# Patient Record
Sex: FEMALE | Race: BLACK OR AFRICAN AMERICAN | NOT HISPANIC OR LATINO | URBAN - METROPOLITAN AREA
[De-identification: names, ages, dates, MRNs, and addresses within clinical notes are randomized per-mention and may not be internally consistent; named-entity substitution may affect disease eponyms.]

---

## 2017-01-06 ENCOUNTER — OFFICE VISIT (OUTPATIENT)
Dept: FAMILY MEDICINE | Facility: CLINIC | Age: 67
End: 2017-01-06

## 2017-01-06 VITALS
DIASTOLIC BLOOD PRESSURE: 72 MMHG | SYSTOLIC BLOOD PRESSURE: 103 MMHG | BODY MASS INDEX: 37.19 KG/M2 | TEMPERATURE: 98 F | WEIGHT: 220 LBS | HEART RATE: 82 BPM

## 2017-01-06 DIAGNOSIS — M72.2 PLANTAR FASCIITIS: ICD-10-CM

## 2017-01-06 DIAGNOSIS — Z00.00 PREVENTATIVE HEALTH CARE: Primary | ICD-10-CM

## 2017-01-06 ASSESSMENT — ANXIETY QUESTIONNAIRES

## 2017-01-06 ASSESSMENT — PATIENT HEALTH QUESTIONNAIRE - PHQ9: 5. POOR APPETITE OR OVEREATING: NOT AT ALL

## 2017-01-06 NOTE — PATIENT INSTRUCTIONS
Do the exercises with stretching like we talked about. Do them for 30 seconds at a time for three times each leg. The do this three to four times per day.     Please return in 2-3 months for recheck of your feet pain.            Plantar Fasciitis  Plantar fasciitis is a painful swelling of the plantar fascia. The plantar fascia is a thick, fibrous layer of tissue. It covers the bones on the bottom of your foot. And it supports the foot bones in an arched position.  This can happen gradually or suddenly. It usually affects one foot at a time. Heel pain can be sharp, like a knife sticking into the bottom of your foot. You may feel pain after exercising, long-distance jogging, stair climbing, long periods of standing, or after standing up.  Risk factors include: non-active lifestyle, arthritis, diabetes, obesity or recent weight gain, flat foot, high arch. Wearing high heels, loose shoes, or shoes with poor arch support for long periods of time adds to the risk. This problem is commonly found in runners and dancers. It also found in people who stand on hard surfaces for long periods of time.    Foot pain from this condition is usually worse in the morning. But it improves with walking. By the end of the day there may be a dull aching. Treatment requires short-term rest and controlling swelling. It may take up to 9 months before all symptoms go away. Rarely, a steroid injection into the foot, or surgery, may be needed.  Home care    If you are overweight, lose weight to help healing.    Choose supportive shoes with good arch support and shock absorbency. Replace athletic shoes when they become worn out. Don t walk or run barefoot.    Premade or custom-fitted shoe inserts may be helpful. Inserts made of silicone seem to be the most effective. Custom-made inserts can be provided by a podiatrist or foot specialist, physical therapist, or orthopedist.    Premade or custom-made night splints keep the heel stretched out while  you sleep. They may prevent morning pain.    Avoid activities that stress the feet: jogging, prolonged standing or walking, contact sports, etc.    First thing in the morning and before sports, stretch the bottom of your feet. Gently flex your ankle so the toes move toward your knee.    Icing may help control heel pain. Apply an ice pack to the heel for 10-20 minutes as a preventive. Or ice your heel after a severe flare-up of symptoms. You may repeat this every 1-2 hours as needed.    You may use over-the-counter pain medicine to control pain, unless another medicine was prescribed. Anti-inflammatory pain medicines, such as ibuprofen or naproxen, may work better than acetaminophen. If you have chronic liver or kidney disease or ever had a stomach ulcer or GI bleeding, talk with your healthcare provider before using these medicines.  Follow-up care   Follow up with your healthcare provider, physical therapist, or podiatrist or foot specialist as advised.  Call for an appointment if pain worsens or there is no relief after a few weeks of home treatment. Shoe inserts, a night splint, or a special boot may be required.  If X-rays were taken, you will be told of any new findings that may affect your care.  When to seek medical advice  Call your healthcare provider right away if any of these occur:     Foot swelling    Redness with increasing pain    8198-4792 [employer]. [address] [place] [address], [place] [address]. All rights reserved. This information is not intended as a substitute for professional medical care. Always follow your healthcare professional's instructions.

## 2017-01-06 NOTE — MR AVS SNAPSHOT
After Visit Summary   1/6/2017    Rahel Schofield    MRN: [id number]           Patient Information     Date Of Birth          1950        Visit Information        Provider Department      1/6/2017 1:50 PM Volodymyr Adams MD [facility]        Today's Diagnoses     Preventative health care    -  1       Care Instructions    Do the exercises with stretching like we talked about. Do them for 30 seconds at a time for three times each leg. The do this three to four times per day.     Please return in 2-3 months for recheck of your feet pain.            Plantar Fasciitis  Plantar fasciitis is a painful swelling of the plantar fascia. The plantar fascia is a thick, fibrous layer of tissue. It covers the bones on the bottom of your foot. And it supports the foot bones in an arched position.  This can happen gradually or suddenly. It usually affects one foot at a time. Heel pain can be sharp, like a knife sticking into the bottom of your foot. You may feel pain after exercising, long-distance jogging, stair climbing, long periods of standing, or after standing up.  Risk factors include: non-active lifestyle, arthritis, diabetes, obesity or recent weight gain, flat foot, high arch. Wearing high heels, loose shoes, or shoes with poor arch support for long periods of time adds to the risk. This problem is commonly found in runners and dancers. It also found in people who stand on hard surfaces for long periods of time.    Foot pain from this condition is usually worse in the morning. But it improves with walking. By the end of the day there may be a dull aching. Treatment requires short-term rest and controlling swelling. It may take up to 9 months before all symptoms go away. Rarely, a steroid injection into the foot, or surgery, may be needed.  Home care    If you are overweight, lose weight to help healing.    Choose supportive shoes with good arch support and shock absorbency. Replace athletic shoes when  they become worn out. Don t walk or run barefoot.    Premade or custom-fitted shoe inserts may be helpful. Inserts made of silicone seem to be the most effective. Custom-made inserts can be provided by a podiatrist or foot specialist, physical therapist, or orthopedist.    Premade or custom-made night splints keep the heel stretched out while you sleep. They may prevent morning pain.    Avoid activities that stress the feet: jogging, prolonged standing or walking, contact sports, etc.    First thing in the morning and before sports, stretch the bottom of your feet. Gently flex your ankle so the toes move toward your knee.    Icing may help control heel pain. Apply an ice pack to the heel for 10-20 minutes as a preventive. Or ice your heel after a severe flare-up of symptoms. You may repeat this every 1-2 hours as needed.    You may use over-the-counter pain medicine to control pain, unless another medicine was prescribed. Anti-inflammatory pain medicines, such as ibuprofen or naproxen, may work better than acetaminophen. If you have chronic liver or kidney disease or ever had a stomach ulcer or GI bleeding, talk with your healthcare provider before using these medicines.  Follow-up care   Follow up with your healthcare provider, physical therapist, or podiatrist or foot specialist as advised.  Call for an appointment if pain worsens or there is no relief after a few weeks of home treatment. Shoe inserts, a night splint, or a special boot may be required.  If X-rays were taken, you will be told of any new findings that may affect your care.  When to seek medical advice  Call your healthcare provider right away if any of these occur:     Foot swelling    Redness with increasing pain    6056-8232 [employer]. [address] [place] [address]. All rights reserved. This information is not intended as a substitute for professional medical care. Always follow your healthcare professional's  instructions.              Follow-ups after your visit        Future tests that were ordered for you today     Open Future Orders        Priority Expected Expires Ordered    Fecal Occult Blood - FIT, iFOB ([facility]) Routine  2017            Who to contact     Please call your clinic at [phone] to:    Ask questions about your health    Make or cancel appointments    Discuss your medicines    Learn about your test results    Speak to your doctor   If you have compliments or concerns about an experience at your clinic, or if you wish to file a complaint, please contact [facility] Patient Relations at [phone] or email us at [email or web address]         Additional Information About Your Visit        MyChart Information     Nostot is an electronic gateway that provides easy, online access to your medical records. With [employer], you can request a clinic appointment, read your test results, renew a prescription or communicate with your care team.     To sign up for Nostot visit the website at [email or web address]   You will be asked to enter the access code listed below, as well as some personal information. Please follow the directions to create your username and password.     Your access code is: [email or web address]  Expires: 2017  2:15 PM     Your access code will  in 90 days. If you need help or a new code, please contact your [facility] or call [phone] for assistance.        Care EveryWhere ID     This is your Care EveryWhere ID. This could be used by other organizations to access your [facility] medical records  [id number]        Your Vitals Were     Pulse Temperature                82 98  F (36.7  C) (Oral)           Blood Pressure from Last 3 Encounters:   17 103/72   16 113/76   16 113/76    Weight from Last 3 Encounters:   17 220 lb (99.791 kg)   16 219 lb (99.338 kg)   16  225 lb 12.8 oz (102.422 kg)               Primary Care Provider Office Phone # Fax #    Volodymyr Adams [phone]       [address] [address]        Thank you!     Thank you for choosing [facility]  for your care. Our goal is always to provide you with excellent care. Hearing back from our patients is one way we can continue to improve our services. Please take a few minutes to complete the written survey that you may receive in the mail after your visit with us. Thank you!             Your Updated Medication List - Protect others around you: Learn how to safely use, store and throw away your medicines at www.disposemymeds.org.          This list is accurate as of: 1/6/17  2:37 PM.  Always use your most recent med list.                   Brand Name Dispense Instructions for use    * ACE KNEE BRACE LARGE/X-LARGE Misc     1 each    1 Units daily       * [employer] LUMBAR/BACK SUPPORT BRACE Misc     1 each    1 Units daily       * acetaminophen 325 MG tablet    TYLENOL     Take 650 mg by mouth       * acetaminophen 325 MG tablet    TYLENOL    100 tablet    Take 2 tablets (650 mg) by mouth every 6 hours as needed for pain       aspirin 81 MG EC tablet     90 tablet    Take 1 tablet (81 mg) by mouth daily       capsaicin 0.075 % cream    ZOSTRIX    120 g    Apply topically 3 times daily       guaiFENesin-dextromethorphan 100-10 MG/5ML syrup    ROBITUSSIN DM     Take 5 mLs by mouth       magnesium hydroxide 400 MG/5ML suspension    MILK OF MAGNESIA     Take 30 mLs by mouth       naproxen 500 MG tablet    NAPROSYN    60 tablet    Take 1 tablet (500 mg) by mouth 2 times daily as needed for moderate pain       nitroglycerin 0.4 MG sublingual tablet    NITROSTAT    25 tablet    For chest pain place 1 tablet under the tongue every 5 minutes for 3 doses. If symptoms persist 5 minutes after 1st dose call 911.       order for DME     1 Units    Equipment being ordered: Heel cups        ranitidine 300 MG tablet    ZANTAC    60 tablet    Take 1 tablet (300 mg) by mouth daily (with breakfast)       TRAMADOL HCL PO      Take 50 mg by mouth Take one tablet by mouth four times daily as needed for pain       * VITAMIN D (CHOLECALCIFEROL) PO      Take by mouth daily       * vitamin D3 2000 UNITS Caps      Take 2,000 Units by mouth       * vitamin D 2000 UNITS tablet     100 tablet    Take 2,000 Units by mouth daily       * Notice:  This list has 7 medication(s) that are the same as other medications prescribed for you. Read the directions carefully, and ask your doctor or other care provider to review them with you.

## 2017-01-06 NOTE — NURSING NOTE
Patient is due for Tdap, but does not want it today.     name: Nitesh Ng  Language: [place]  Agency: [facility]  Phone number: [phone]

## 2017-01-06 NOTE — PROGRESS NOTES
Preceptor attestation:  Patient seen and discussed with the resident.  Assessment and plan reviewed with resident and agreed upon.  Supervising physician: Angie Hummel  [facility]

## 2017-01-06 NOTE — PROGRESS NOTES
OFFICE VISIT    ASSESSMENT/PLAN:   Rahel was seen today for headache, forms and other.  Diagnoses and all orders for this visit:    Plantar fasciitis: unchanged. Reviewed appropriate stretching and treatment options. Continue conservative management.   - Continue stretching  - Continue NSAIDs as needed.   - Continue PT.     Completed health care summary form and given to patient.     Preventative health care  -     Fecal Occult Blood - FIT, iFOB ([facility]); Future    Patient instructed to return in 2-3 months for recheck.    SUBJECTIVE: 67 year old female with history of plantar fasciitis, chronic low back pain presenting with heel pain and request for completion of Health Care Summary.     Heel pain: plantar fasciitis. Went to PT. Revisited pathophysiology of plantar fasciitis and anticipated course of treatment. Discussed treatment options. Reviewed stretches. Made appropriate adjustments in patient's stretching form.     The 10 point ROS is negative except as stated in the HPI.    A Syncbak  was used during the visit.     OBJECTIVE:   /72 mmHg  Pulse 82  Temp(Src) 98  F (36.7  C) (Oral)  Ht   Wt 220 lb (99.791 kg)  GENERAL: No acute distress. Cooperative. Well-appearing.  HEENT: Normocephalic atraumatic. No conjunctival injection or scleral icterus. Nares patent without rhinorrhea. Oral mucosa is moist  EXTR: Moves all limbs independently. Bilateral upper extremity strength 5/5 in adduction, abduction, flexion, and extension. Bilateral lower extremity strength 5/5 in adduction, abduction, flexion, and extension. No lower extremity edema x 2.    Discussed with Dr. Hummel who agrees with the above assessment and plan.    Volodymyr Adams MD

## 2017-01-07 ASSESSMENT — PATIENT HEALTH QUESTIONNAIRE - PHQ9: SUM OF ALL RESPONSES TO PHQ QUESTIONS 1-9: 0

## 2017-01-07 ASSESSMENT — ANXIETY QUESTIONNAIRES: GAD7 TOTAL SCORE: 0

## 2017-01-11 DIAGNOSIS — Z00.00 PREVENTATIVE HEALTH CARE: ICD-10-CM

## 2017-01-11 LAB — HEMOCCULT STL QL IA: NEGATIVE

## 2017-02-06 ENCOUNTER — OFFICE VISIT (OUTPATIENT)
Dept: FAMILY MEDICINE | Facility: CLINIC | Age: 67
End: 2017-02-06

## 2017-02-06 VITALS
WEIGHT: 219.4 LBS | DIASTOLIC BLOOD PRESSURE: 73 MMHG | BODY MASS INDEX: 35.26 KG/M2 | HEIGHT: 66 IN | SYSTOLIC BLOOD PRESSURE: 108 MMHG | HEART RATE: 92 BPM

## 2017-02-06 DIAGNOSIS — E55.9 VITAMIN D DEFICIENCY: Primary | ICD-10-CM

## 2017-02-06 DIAGNOSIS — M72.2 PLANTAR FASCIITIS OF RIGHT FOOT: ICD-10-CM

## 2017-02-06 DIAGNOSIS — K59.00 CONSTIPATION, UNSPECIFIED CONSTIPATION TYPE: ICD-10-CM

## 2017-02-06 DIAGNOSIS — Z00.00 HEALTHCARE MAINTENANCE: ICD-10-CM

## 2017-02-06 RX ORDER — NAPROXEN 500 MG/1
500 TABLET ORAL 2 TIMES DAILY PRN
Qty: 60 TABLET | Refills: 0 | Status: SHIPPED | OUTPATIENT
Start: 2017-02-06 | End: 2017-04-06

## 2017-02-06 RX ORDER — ACETAMINOPHEN 325 MG/1
650 TABLET ORAL EVERY 6 HOURS PRN
Qty: 100 TABLET | Refills: 0 | Status: SHIPPED | OUTPATIENT
Start: 2017-02-06

## 2017-02-06 RX ORDER — SENNOSIDES 8.6 MG
2 TABLET ORAL 2 TIMES DAILY
Qty: 120 TABLET | Refills: 1 | Status: SHIPPED | OUTPATIENT
Start: 2017-02-06

## 2017-02-06 RX ORDER — CHOLECALCIFEROL (VITAMIN D3) 50 MCG
2000 TABLET ORAL DAILY
Qty: 100 TABLET | Refills: 3 | Status: SHIPPED | OUTPATIENT
Start: 2017-02-06 | End: 2017-05-11
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
## 2017-02-06 NOTE — PATIENT INSTRUCTIONS
Rahel was seen today for back pain and musculoskeletal problem.    Diagnoses and all orders for this visit:    Vitamin D deficiency  -     Cholecalciferol (VITAMIN D) 2000 UNITS tablet; Take 2,000 Units by mouth daily    Midline low back pain without sciatica, unspecified chronicity  -     acetaminophen (TYLENOL) 325 MG tablet; Take 2 tablets (650 mg) by mouth every 6 hours as needed for pain  -     naproxen (NAPROSYN) 500 MG tablet; Take 1 tablet (500 mg) by mouth 2 times daily as needed for moderate pain    Plantar fasciitis of right foot  -     acetaminophen (TYLENOL) 325 MG tablet; Take 2 tablets (650 mg) by mouth every 6 hours as needed for pain  -     naproxen (NAPROSYN) 500 MG tablet; Take 1 tablet (500 mg) by mouth 2 times daily as needed for moderate pain    Constipation, unspecified constipation type  -     sennosides (SENOKOT) 8.6 MG tablet; Take 2 tablets by mouth 2 times daily      Please return to clinic in 2-3 months to recheck, sooner as needed.     Thank you for coming to [facility].  **If you had lab testing today and your results are reassuring or normal they will be be mailed to you within 7 days.   **If the lab tests need quick action we will call you with the results.  The phone number we will call with results is # [phone] (home) . If this is not the best number please call our clinic and change the number.  If you need any refills please call your pharmacy and they will contact us.  If you have any concerns about today's visit or wish to schedule another appointment please call our office during normal business hours [phone] (8-5:00 M-F)  If you have urgent medical concerns please call [phone] at any time of the day.  If you a medical emergency please call [phone]  Again thank you for choosing [facility] and please let us know how we can best partner with you to improve you and your family's health.        Treating Plantar Fasciitis  First, your healthcare provider  tries to determine the cause of your problem in order to suggest ways to relieve pain. If your pain is due to poor foot mechanics, custom-made shoe inserts (orthoses) may help.    Reduce symptoms    To relieve mild symptoms, try aspirin, ibuprofen, or other medicines as directed. Rubbing ice on the affected area may also help.    To reduce severe pain and swelling, your healthcare provider may prescribe pills or injections or a walking cast in some instances. Physical therapy, such as ultrasound or a daily stretching program, may also be recommended. Surgery is rarely required.    To reduce symptoms caused by poor foot mechanics, your foot may be taped. This supports the arch and temporarily controls movement. Night splints may also help by stretching the fascia.  Control movement  If taping helps, your healthcare provider may prescribe orthoses. Built from plaster casts of your feet, these inserts control the way your foot moves. As a result, your symptoms should go away.  Reduce overuse  Every time your foot strikes the ground, the plantar fascia is stretched. You can reduce the strain on the plantar fascia and the possibility of overuse by following these suggestions:    Lose any excess weight.    Avoid running on hard or uneven ground.    Use orthoses at all times in your shoes and house slippers.  If surgery is needed  Your healthcare provider may consider surgery if other types of treatment don't control your pain. During surgery, the plantar fascia is partially cut to release tension. As you heal, fibrous tissue fills the space between the heel bone and the plantar fascia.     2036-5085 [employer]. [address] [place] [address], [place] [address]. All rights reserved. This information is not intended as a substitute for professional medical care. Always follow your healthcare professional's instructions.    You are scheduled for MAMMOGRAM at:  [address].  [place]  [place] [address]  [phone]  Date:  Wednesday February 15, 2017  Time:  10:45 AM  No lotion, powder, deodorant or perfume under the arms and around the breast area.   has been requested.  Given to Gwyn Solis ().  Dinah MAYBERRY Pack  2/9/17

## 2017-02-06 NOTE — MR AVS SNAPSHOT
After Visit Summary   2/6/2017    Rahel Schofield    MRN: [id number]           Patient Information     Date Of Birth          1950        Visit Information        Provider Department      2/6/2017 4:30 PM Volodymyr Adams MD [facility]        Today's Diagnoses     Vitamin D deficiency    -  1     Left knee pain, unspecified chronicity         Left-sided low back pain with left-sided sciatica, unspecified chronicity         Midline low back pain without sciatica, unspecified chronicity         Plantar fasciitis of right foot         Constipation, unspecified constipation type           Care Instructions    Rahel was seen today for back pain and musculoskeletal problem.    Diagnoses and all orders for this visit:    Vitamin D deficiency  -     Cholecalciferol (VITAMIN D) 2000 UNITS tablet; Take 2,000 Units by mouth daily    Midline low back pain without sciatica, unspecified chronicity  -     acetaminophen (TYLENOL) 325 MG tablet; Take 2 tablets (650 mg) by mouth every 6 hours as needed for pain  -     naproxen (NAPROSYN) 500 MG tablet; Take 1 tablet (500 mg) by mouth 2 times daily as needed for moderate pain    Plantar fasciitis of right foot  -     acetaminophen (TYLENOL) 325 MG tablet; Take 2 tablets (650 mg) by mouth every 6 hours as needed for pain  -     naproxen (NAPROSYN) 500 MG tablet; Take 1 tablet (500 mg) by mouth 2 times daily as needed for moderate pain    Constipation, unspecified constipation type  -     sennosides (SENOKOT) 8.6 MG tablet; Take 2 tablets by mouth 2 times daily      Please return to clinic in 2-3 months to recheck, sooner as needed.     Thank you for coming to [facility].  **If you had lab testing today and your results are reassuring or normal they will be be mailed to you within 7 days.   **If the lab tests need quick action we will call you with the results.  The phone number we will call with results is # [phone] (home) . If this is not the best number  please call our clinic and change the number.  If you need any refills please call your pharmacy and they will contact us.  If you have any concerns about today's visit or wish to schedule another appointment please call our office during normal business hours [phone] (8-5:00 M-F)  If you have urgent medical concerns please call [phone] at any time of the day.  If you a medical emergency please call 911  Again thank you for choosing [facility] and please let us know how we can best partner with you to improve you and your family's health.        Treating Plantar Fasciitis  First, your healthcare provider tries to determine the cause of your problem in order to suggest ways to relieve pain. If your pain is due to poor foot mechanics, custom-made shoe inserts (orthoses) may help.    Reduce symptoms    To relieve mild symptoms, try aspirin, ibuprofen, or other medicines as directed. Rubbing ice on the affected area may also help.    To reduce severe pain and swelling, your healthcare provider may prescribe pills or injections or a walking cast in some instances. Physical therapy, such as ultrasound or a daily stretching program, may also be recommended. Surgery is rarely required.    To reduce symptoms caused by poor foot mechanics, your foot may be taped. This supports the arch and temporarily controls movement. Night splints may also help by stretching the fascia.  Control movement  If taping helps, your healthcare provider may prescribe orthoses. Built from plaster casts of your feet, these inserts control the way your foot moves. As a result, your symptoms should go away.  Reduce overuse  Every time your foot strikes the ground, the plantar fascia is stretched. You can reduce the strain on the plantar fascia and the possibility of overuse by following these suggestions:    Lose any excess weight.    Avoid running on hard or uneven ground.    Use orthoses at all times in your shoes and house  slippers.  If surgery is needed  Your healthcare provider may consider surgery if other types of treatment don't control your pain. During surgery, the plantar fascia is partially cut to release tension. As you heal, fibrous tissue fills the space between the heel bone and the plantar fascia.     6291-1071 [employer]. [address] [place] [address] 15196. All rights reserved. This information is not intended as a substitute for professional medical care. Always follow your healthcare professional's instructions.                Follow-ups after your visit        Who to contact     Please call your clinic at [phone] to:    Ask questions about your health    Make or cancel appointments    Discuss your medicines    Learn about your test results    Speak to your doctor   If you have compliments or concerns about an experience at your clinic, or if you wish to file a complaint, please contact [facility] Patient Relations at [phone] or email us at [email or web address]         Additional Information About Your Visit        [employer] Information     Automated Insights is an electronic gateway that provides easy, online access to your medical records. With Automated Insights, you can request a clinic appointment, read your test results, renew a prescription or communicate with your care team.     To sign up for [employer]ights visit the website at [email or web address]   You will be asked to enter the access code listed below, as well as some personal information. Please follow the directions to create your username and password.     Your access code is: [email or web address]  Expires: 2017  4:54 PM     Your access code will  in 90 days. If you need help or a new code, please contact your [facility] or call [phone] for assistance.        Care EveryWhere ID     This is your Care EveryWhere ID. This could be used by other organizations to access your  "[place] medical records  [id number]        Your Vitals Were     Pulse Height BMI (Body Mass Index)             92 5' 6\" (167.6 cm) 35.43 kg/m2          Blood Pressure from Last 3 Encounters:   02/06/17 108/73   01/06/17 103/72   12/29/16 113/76    Weight from Last 3 Encounters:   02/06/17 219 lb 6.4 oz (99.519 kg)   01/06/17 220 lb (99.791 kg)   12/29/16 219 lb (99.338 kg)              Today, you had the following     No orders found for display         Today's Medication Changes          These changes are accurate as of: 2/6/17  4:54 PM.  If you have any questions, ask your nurse or doctor.               Start taking these medicines.        Dose/Directions    sennosides 8.6 MG tablet   Commonly known as:  SENOKOT   Used for:  Constipation, unspecified constipation type   Started by:  Volodymyr Adams MD        Dose:  2 tablet   Take 2 tablets by mouth 2 times daily   Quantity:  120 tablet   Refills:  1         Stop taking these medicines if you haven't already. Please contact your care team if you have questions.     magnesium hydroxide 400 MG/5ML suspension   Commonly known as:  MILK OF MAGNESIA   Stopped by:  Volodymyr Adams MD                Where to get your medicines      These medications were sent to [facility] - [place], [place] - [address], [place] [address]     Phone:  [phone]    - acetaminophen 325 MG tablet  - naproxen 500 MG tablet  - sennosides 8.6 MG tablet  - vitamin D 2000 UNITS tablet             Primary Care Provider Office Phone # Fax #    Volodymyr Adams [phone]       [address] [address]        Thank you!     Thank you for choosing [facility]  for your care. Our goal is always to provide you with excellent care. Hearing back from our patients is one way we can continue to improve our services. Please take a few minutes to complete the written survey that you may receive in the mail after your " visit with us. Thank you!             Your Updated Medication List - Protect others around you: Learn how to safely use, store and throw away your medicines at www.disposemymeds.org.          This list is accurate as of: 2/6/17  4:54 PM.  Always use your most recent med list.                   Brand Name Dispense Instructions for use    * ACE KNEE BRACE LARGE/X-LARGE Misc     1 each    1 Units daily       * CVS LUMBAR/BACK SUPPORT BRACE Misc     1 each    1 Units daily       * acetaminophen 325 MG tablet    TYLENOL     Take 650 mg by mouth       * acetaminophen 325 MG tablet    TYLENOL    100 tablet    Take 2 tablets (650 mg) by mouth every 6 hours as needed for pain       aspirin 81 MG EC tablet     90 tablet    Take 1 tablet (81 mg) by mouth daily       capsaicin 0.075 % cream    ZOSTRIX    120 g    Apply topically 3 times daily       guaiFENesin-dextromethorphan 100-10 MG/5ML syrup    ROBITUSSIN DM     Take 5 mLs by mouth       naproxen 500 MG tablet    NAPROSYN    60 tablet    Take 1 tablet (500 mg) by mouth 2 times daily as needed for moderate pain       nitroglycerin 0.4 MG sublingual tablet    NITROSTAT    25 tablet    For chest pain place 1 tablet under the tongue every 5 minutes for 3 doses. If symptoms persist 5 minutes after 1st dose call 911.       order for DME     1 Units    Equipment being ordered: Heel cups       ranitidine 300 MG tablet    ZANTAC    60 tablet    Take 1 tablet (300 mg) by mouth daily (with breakfast)       sennosides 8.6 MG tablet    SENOKOT    120 tablet    Take 2 tablets by mouth 2 times daily       TRAMADOL HCL PO      Take 50 mg by mouth Take one tablet by mouth four times daily as needed for pain       * VITAMIN D (CHOLECALCIFEROL) PO      Take by mouth daily       * vitamin D3 2000 UNITS Caps      Take 2,000 Units by mouth       * vitamin D 2000 UNITS tablet     100 tablet    Take 2,000 Units by mouth daily       * Notice:  This list has 7 medication(s) that are the same as  other medications prescribed for you. Read the directions carefully, and ask your doctor or other care provider to review them with you.

## 2017-02-10 NOTE — PROGRESS NOTES
Preceptor attestation:  Patient seen and discussed with the resident. Assessment and plan reviewed with resident and agreed upon.  Supervising physician: Chris Wolff  [facility]

## 2017-02-16 ENCOUNTER — RECORDS - HEALTHEAST (OUTPATIENT)
Dept: MAMMOGRAPHY | Facility: CLINIC | Age: 67
End: 2017-02-16

## 2017-02-16 DIAGNOSIS — Z12.31 ENCOUNTER FOR SCREENING MAMMOGRAM FOR MALIGNANT NEOPLASM OF BREAST: ICD-10-CM

## 2017-04-06 ENCOUNTER — OFFICE VISIT (OUTPATIENT)
Dept: FAMILY MEDICINE | Facility: CLINIC | Age: 67
End: 2017-04-06

## 2017-04-06 VITALS
TEMPERATURE: 98.4 F | BODY MASS INDEX: 36.96 KG/M2 | HEART RATE: 78 BPM | OXYGEN SATURATION: 96 % | DIASTOLIC BLOOD PRESSURE: 67 MMHG | WEIGHT: 229 LBS | SYSTOLIC BLOOD PRESSURE: 95 MMHG

## 2017-04-06 DIAGNOSIS — G89.29 CHRONIC LEFT-SIDED LOW BACK PAIN WITH LEFT-SIDED SCIATICA: ICD-10-CM

## 2017-04-06 DIAGNOSIS — M54.42 CHRONIC LEFT-SIDED LOW BACK PAIN WITH LEFT-SIDED SCIATICA: ICD-10-CM

## 2017-04-06 DIAGNOSIS — M72.2 PLANTAR FASCIITIS OF RIGHT FOOT: ICD-10-CM

## 2017-04-06 DIAGNOSIS — M25.562 CHRONIC PAIN OF LEFT KNEE: ICD-10-CM

## 2017-04-06 DIAGNOSIS — G89.29 CHRONIC PAIN OF LEFT KNEE: ICD-10-CM

## 2017-04-06 RX ORDER — LEG BRACE
1 EACH MISCELLANEOUS DAILY
Qty: 1 EACH | Refills: 0 | Status: SHIPPED | OUTPATIENT
Start: 2017-04-06

## 2017-04-06 RX ORDER — LEG BRACE
1 EACH MISCELLANEOUS DAILY
Qty: 1 EACH | Refills: 0 | Status: SHIPPED | OUTPATIENT
Start: 2017-04-06 | End: 2017-04-06

## 2017-04-06 RX ORDER — NAPROXEN 500 MG/1
500 TABLET ORAL 2 TIMES DAILY PRN
Qty: 60 TABLET | Refills: 3 | Status: SHIPPED | OUTPATIENT
Start: 2017-04-06 | End: 2017-05-11

## 2017-04-06 NOTE — MR AVS SNAPSHOT
After Visit Summary   2017    Rahel Schofield    MRN: [id number]           Patient Information     Date Of Birth          1950        Visit Information        Provider Department      2017 2:30 PM Moises Narayanan MD [facility]        Today's Diagnoses     Chronic left-sided low back pain with left-sided sciatica        Chronic pain of left knee        Plantar fasciitis of right foot          Care Instructions    1) Take sheets to [facility] for Knee, Back braces.    2) Use Naproxen for moderate to severe pain        Follow-ups after your visit        Who to contact     Please call your clinic at [phone] to:    Ask questions about your health    Make or cancel appointments    Discuss your medicines    Learn about your test results    Speak to your doctor   If you have compliments or concerns about an experience at your clinic, or if you wish to file a complaint, please contact [facility] Patient Relations at [phone] or email us at [email or web address]         Additional Information About Your Visit        MyChart Information     E[employer]el Learn is an electronic gateway that provides easy, online access to your medical records. With E[employer] Learn, you can request a clinic appointment, read your test results, renew a prescription or communicate with your care team.     To sign up for [employer] visit the website at [email or web address]   You will be asked to enter the access code listed below, as well as some personal information. Please follow the directions to create your username and password.     Your access code is: [email or web address]  Expires: 2017  5:54 PM     Your access code will  in 90 days. If you need help or a new code, please contact your [facility] or call [phone] for assistance.        Care EveryWhere ID     This is your Care EveryWhere ID. This could be used by other organizations to access your  [place] medical records  [id number]        Your Vitals Were     Pulse Temperature Pulse Oximetry Breastfeeding? BMI (Body Mass Index)       78 98.4  F (36.9  C) 96% No 36.96 kg/m2        Blood Pressure from Last 3 Encounters:   04/06/17 95/67   02/06/17 108/73   01/06/17 103/72    Weight from Last 3 Encounters:   04/06/17 229 lb (103.9 kg)   02/06/17 219 lb 6.4 oz (99.5 kg)   01/06/17 220 lb (99.8 kg)              Today, you had the following     No orders found for display         Today's Medication Changes          These changes are accurate as of: 4/6/17  3:18 PM.  If you have any questions, ask your nurse or doctor.               Start taking these medicines.        Dose/Directions    * ACE KNEE BRACE LARGE/X-LARGE Misc   Used for:  Chronic pain of left knee   Started by:  Moises Narayanan MD        Dose:  1 Units   1 Units daily   Quantity:  1 each   Refills:  0       * CVS LUMBAR/BACK SUPPORT BRACE Misc   Used for:  Chronic left-sided low back pain with left-sided sciatica   Started by:  Moises Narayanan MD        Dose:  1 Units   1 Units daily   Quantity:  1 each   Refills:  0       * Notice:  This list has 2 medication(s) that are the same as other medications prescribed for you. Read the directions carefully, and ask your doctor or other care provider to review them with you.         Where to get your medicines      [facility] [place], [place] - [address], [place] [address]     Phone:  [phone]     naproxen 500 MG tablet         Some of these will need a paper prescription and others can be bought over the counter.  Ask your nurse if you have questions.     Bring a paper prescription for each of these medications     ACE KNEE BRACE LARGE/X-LARGE Misc    CVS LUMBAR/BACK SUPPORT BRACE Misc                Primary Care Provider Office Phone # Fax #    Nabil Mesa [phone]       [address]  [facility] [address]        Thank you!     Thank you for choosing [facility]  for your care. Our goal is always to provide you with excellent care. Hearing back from our patients is one way we can continue to improve our services. Please take a few minutes to complete the written survey that you may receive in the mail after your visit with us. Thank you!             Your Updated Medication List - Protect others around you: Learn how to safely use, store and throw away your medicines at www.disposemymeds.org.          This list is accurate as of: 4/6/17  3:18 PM.  Always use your most recent med list.                   Brand Name Dispense Instructions for use    * ACE KNEE BRACE LARGE/X-LARGE Misc     1 each    1 Units daily       * CVS LUMBAR/BACK SUPPORT BRACE Misc     1 each    1 Units daily       * acetaminophen 325 MG tablet    TYLENOL     Take 650 mg by mouth       * acetaminophen 325 MG tablet    TYLENOL    100 tablet    Take 2 tablets (650 mg) by mouth every 6 hours as needed for pain       aspirin 81 MG EC tablet     90 tablet    Take 1 tablet (81 mg) by mouth daily       capsaicin 0.075 % cream    ZOSTRIX    120 g    Apply topically 3 times daily       guaiFENesin-dextromethorphan 100-10 MG/5ML syrup    ROBITUSSIN DM     Take 5 mLs by mouth       naproxen 500 MG tablet    NAPROSYN    60 tablet    Take 1 tablet (500 mg) by mouth 2 times daily as needed for moderate pain       nitroglycerin 0.4 MG sublingual tablet    NITROSTAT    25 tablet    For chest pain place 1 tablet under the tongue every 5 minutes for 3 doses. If symptoms persist 5 minutes after 1st dose call 911.       order for DME     1 Units    Equipment being ordered: Heel cups       ranitidine 300 MG tablet    ZANTAC    60 tablet    Take 1 tablet (300 mg) by mouth daily (with breakfast)       sennosides 8.6 MG tablet    SENOKOT    120 tablet    Take 2 tablets by mouth 2 times daily       TRAMADOL HCL PO      Take 50 mg by mouth Take one  tablet by mouth four times daily as needed for pain       * VITAMIN D (CHOLECALCIFEROL) PO      Take by mouth daily       * vitamin D3 2000 UNITS Caps      Take 2,000 Units by mouth       * vitamin D 2000 UNITS tablet     100 tablet    Take 2,000 Units by mouth daily       * Notice:  This list has 7 medication(s) that are the same as other medications prescribed for you. Read the directions carefully, and ask your doctor or other care provider to review them with you.

## 2017-04-06 NOTE — PATIENT INSTRUCTIONS
1) Take sheets to [facility] for Knee, Back braces.    2) Use Naproxen for moderate to severe pain

## 2017-04-06 NOTE — NURSING NOTE
name: Sis  Language: Venezuelan  Agency: [facility]  Phone number: [phone]
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
## 2017-05-11 ENCOUNTER — OFFICE VISIT (OUTPATIENT)
Dept: FAMILY MEDICINE | Facility: CLINIC | Age: 67
End: 2017-05-11

## 2017-05-11 VITALS
WEIGHT: 227.8 LBS | DIASTOLIC BLOOD PRESSURE: 74 MMHG | HEART RATE: 90 BPM | TEMPERATURE: 97.8 F | BODY MASS INDEX: 37.95 KG/M2 | HEIGHT: 65 IN | SYSTOLIC BLOOD PRESSURE: 105 MMHG

## 2017-05-11 DIAGNOSIS — E55.9 VITAMIN D DEFICIENCY: ICD-10-CM

## 2017-05-11 DIAGNOSIS — M72.2 PLANTAR FASCIITIS OF RIGHT FOOT: ICD-10-CM

## 2017-05-11 DIAGNOSIS — M19.011 OSTEOARTHRITIS OF BOTH SHOULDERS, UNSPECIFIED OSTEOARTHRITIS TYPE: Primary | ICD-10-CM

## 2017-05-11 DIAGNOSIS — M19.012 OSTEOARTHRITIS OF BOTH SHOULDERS, UNSPECIFIED OSTEOARTHRITIS TYPE: Primary | ICD-10-CM

## 2017-05-11 DIAGNOSIS — M54.50 CHRONIC BILATERAL LOW BACK PAIN WITHOUT SCIATICA: ICD-10-CM

## 2017-05-11 DIAGNOSIS — G89.29 CHRONIC BILATERAL LOW BACK PAIN WITHOUT SCIATICA: ICD-10-CM

## 2017-05-11 RX ORDER — ASPIRIN 81 MG
TABLET,CHEWABLE ORAL 3 TIMES DAILY
Qty: 1 TUBE | Refills: 3 | Status: SHIPPED | OUTPATIENT
Start: 2017-05-11 | End: 2017-05-11

## 2017-05-11 RX ORDER — CAPSAICIN 0.025 %
CREAM (GRAM) TOPICAL
Qty: 1 TUBE | Refills: 3 | Status: SHIPPED | OUTPATIENT
Start: 2017-05-11

## 2017-05-11 RX ORDER — NAPROXEN 500 MG/1
500 TABLET ORAL 2 TIMES DAILY PRN
Qty: 60 TABLET | Refills: 3 | Status: SHIPPED | OUTPATIENT
Start: 2017-05-11

## 2017-05-11 RX ORDER — CHOLECALCIFEROL (VITAMIN D3) 50 MCG
2000 TABLET ORAL DAILY
Qty: 100 TABLET | Refills: 3 | Status: SHIPPED | OUTPATIENT
Start: 2017-05-11

## 2017-05-11 NOTE — PROGRESS NOTES
Preceptor attestation:  Patient seen and discussed with the resident. Assessment and plan reviewed with resident and agreed upon.  Supervising physician: Moises Narayanan  [facility]
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
## 2017-05-11 NOTE — PATIENT INSTRUCTIONS
Rahel,     Please  your prescription for a cream to help with your pain. Also I would like you to try physical therapy. You can stop at the desk upstairs to discuss this.     I would like you to come back and see me in 4-6 weeks after trying physical therapy.     Thank you for allowing me to be a part of your health care team!    Sincerely,   Dr. Betts    [facility]  Physical Therapy  [phone]  Referral has been faxed they will contact pt to schedule  Julia Rodríguez 9:24 AM 5/12/2017

## 2017-05-11 NOTE — MR AVS SNAPSHOT
After Visit Summary   5/11/2017    Rahel Schofield    MRN: [id number]           Patient Information     Date Of Birth          1950        Visit Information        Provider Department      5/11/2017 3:10 PM Yaa Betts MD [facility]        Today's Diagnoses     Osteoarthritis of both shoulders, unspecified osteoarthritis type    -  1    Chronic bilateral low back pain without sciatica          Care Instructions    Rahel,     Please  your prescription for a cream to help with your pain. Also I would like you to try physical therapy. You can stop at the desk upstairs to discuss this.     I would like you to come back and see me in 4-6 weeks after trying physical therapy.     Thank you for allowing me to be a part of your health care team!    Sincerely,   Dr. Betts          Follow-ups after your visit        Additional Services     PHYSICAL THERAPY REFERRAL       PT/OT REFERRAL  Rahel Schofield  1950  Phone #: [phone] (home)    needed: Yes  Language: Dutch    PT/OT  Facility:   Other: per patient request    History: back pain after accident in 2008    Precautions/Contraindications: None    Imaging Studies: None    Surgical Procedure/Test Results: None    Treatment Goals:   Increase: Flexibility, Strength and ROM  Decrease: Pain    Prognosis: good    Visits: Up to 12    Evaluate: Evaluate and treat    Plan: Manual Therapy, Flexibility Exercise and Strength Exercise                  Future tests that were ordered for you today     Open Future Orders        Priority Expected Expires Ordered    PHYSICAL THERAPY REFERRAL Routine  5/11/2018 5/11/2017            Who to contact     Please call your clinic at [phone] to:    Ask questions about your health    Make or cancel appointments    Discuss your medicines    Learn about your test results    Speak to your doctor   If you have compliments or concerns about an experience at your clinic, or if you wish to file a  "complaint, please contact [facility] Patient Relations at [phone] or email us at [email or web address]         Additional Information About Your Visit        [employer] Information     [employer] is an electronic gateway that provides easy, online access to your medical records. With [employer], you can request a clinic appointment, read your test results, renew a prescription or communicate with your care team.     To sign up for [employer] visit the website at [email or web address]   You will be asked to enter the access code listed below, as well as some personal information. Please follow the directions to create your username and password.     Your access code is: [email or web address]  Expires: 2017  4:03 PM     Your access code will  in 90 days. If you need help or a new code, please contact your [facility] or call [phone] for assistance.        Care EveryWhere ID     This is your Care EveryWhere ID. This could be used by other organizations to access your [facility] medical records  [id number]        Your Vitals Were     Pulse Temperature Height BMI (Body Mass Index)          90 97.8  F (36.6  C) (Oral) 5' 4.5\" (163.8 cm) 38.5 kg/m2         Blood Pressure from Last 3 Encounters:   17 105/74   17 95/67   17 108/73    Weight from Last 3 Encounters:   17 227 lb 12.8 oz (103.3 kg)   17 229 lb (103.9 kg)   17 219 lb 6.4 oz (99.5 kg)                 Today's Medication Changes          These changes are accurate as of: 17  4:03 PM.  If you have any questions, ask your nurse or doctor.               These medicines have changed or have updated prescriptions.        Dose/Directions    * capsaicin 0.075 % cream   Commonly known as:  ZOSTRIX   This may have changed:  Another medication with the same name was added. Make sure you understand how and when to take each.   Used for:  Left knee pain, Left-sided " low back pain with left-sided sciatica   Changed by:  Rahul Cody,         Apply topically 3 times daily   Quantity:  120 g   Refills:  2       * Capsaicin 0.1 % cream   This may have changed:  You were already taking a medication with the same name, and this prescription was added. Make sure you understand how and when to take each.   Used for:  Osteoarthritis of both shoulders, unspecified osteoarthritis type   Changed by:  Yaa Betts MD        Apply topically 3 times daily   Quantity:  1 Tube   Refills:  3       * Notice:  This list has 2 medication(s) that are the same as other medications prescribed for you. Read the directions carefully, and ask your doctor or other care provider to review them with you.         Where to get your medicines      These medications were sent to [facility] - [place], [place] - [address], [place] [address]     Phone:  [phone]     Capsaicin 0.1 % cream                Primary Care Provider Office Phone # Fax #    Nabil Mesa [phone]       [address] [address]        Thank you!     Thank you for choosing [facility]  for your care. Our goal is always to provide you with excellent care. Hearing back from our patients is one way we can continue to improve our services. Please take a few minutes to complete the written survey that you may receive in the mail after your visit with us. Thank you!             Your Updated Medication List - Protect others around you: Learn how to safely use, store and throw away your medicines at www.disposemymeds.org.          This list is accurate as of: 5/11/17  4:03 PM.  Always use your most recent med list.                   Brand Name Dispense Instructions for use    * ACE KNEE BRACE LARGE/X-LARGE Misc     1 each    1 Units daily       * CVS LUMBAR/BACK SUPPORT BRACE Misc     1 each    1 Units daily       * acetaminophen 325 MG  tablet    TYLENOL     Take 650 mg by mouth       * acetaminophen 325 MG tablet    TYLENOL    100 tablet    Take 2 tablets (650 mg) by mouth every 6 hours as needed for pain       aspirin 81 MG EC tablet     90 tablet    Take 1 tablet (81 mg) by mouth daily       * capsaicin 0.075 % cream    ZOSTRIX    120 g    Apply topically 3 times daily       * Capsaicin 0.1 % cream     1 Tube    Apply topically 3 times daily       guaiFENesin-dextromethorphan 100-10 MG/5ML syrup    ROBITUSSIN DM     Take 5 mLs by mouth       naproxen 500 MG tablet    NAPROSYN    60 tablet    Take 1 tablet (500 mg) by mouth 2 times daily as needed for moderate pain       nitroglycerin 0.4 MG sublingual tablet    NITROSTAT    25 tablet    For chest pain place 1 tablet under the tongue every 5 minutes for 3 doses. If symptoms persist 5 minutes after 1st dose call 911.       order for DME     1 Units    Equipment being ordered: Heel cups       ranitidine 300 MG tablet    ZANTAC    60 tablet    Take 1 tablet (300 mg) by mouth daily (with breakfast)       sennosides 8.6 MG tablet    SENOKOT    120 tablet    Take 2 tablets by mouth 2 times daily       TRAMADOL HCL PO      Take 50 mg by mouth Take one tablet by mouth four times daily as needed for pain       * VITAMIN D (CHOLECALCIFEROL) PO      Take by mouth daily       * vitamin D3 2000 UNITS Caps      Take 2,000 Units by mouth       * vitamin D 2000 UNITS tablet     100 tablet    Take 2,000 Units by mouth daily       * Notice:  This list has 9 medication(s) that are the same as other medications prescribed for you. Read the directions carefully, and ask your doctor or other care provider to review them with you.

## 2017-05-15 ENCOUNTER — AMBULATORY - HEALTHEAST (OUTPATIENT)
Dept: ADMINISTRATIVE | Facility: REHABILITATION | Age: 67
End: 2017-05-15

## 2017-05-15 DIAGNOSIS — G89.29 CHRONIC BILATERAL LOW BACK PAIN WITHOUT SCIATICA: ICD-10-CM

## 2017-05-15 DIAGNOSIS — M54.50 CHRONIC BILATERAL LOW BACK PAIN WITHOUT SCIATICA: ICD-10-CM

## 2017-09-11 ENCOUNTER — OFFICE VISIT (OUTPATIENT)
Dept: FAMILY MEDICINE | Facility: CLINIC | Age: 67
End: 2017-09-11

## 2017-09-11 VITALS
WEIGHT: 225.8 LBS | SYSTOLIC BLOOD PRESSURE: 90 MMHG | TEMPERATURE: 98.6 F | BODY MASS INDEX: 38.16 KG/M2 | DIASTOLIC BLOOD PRESSURE: 65 MMHG | HEART RATE: 87 BPM

## 2017-09-11 DIAGNOSIS — S92.902A FRACTURE, FOOT, LEFT, CLOSED, INITIAL ENCOUNTER: Primary | ICD-10-CM

## 2017-09-11 RX ORDER — OXYCODONE AND ACETAMINOPHEN 5; 325 MG/1; MG/1
1 TABLET ORAL EVERY 6 HOURS PRN
Qty: 18 TABLET | Refills: 0 | Status: SHIPPED | OUTPATIENT
Start: 2017-09-11

## 2017-09-11 NOTE — PROGRESS NOTES
Family History   Problem Relation Age of Onset     DIABETES No family hx of      Coronary Artery Disease No family hx of      Breast Cancer No family hx of      Colon Cancer No family hx of      Prostate Cancer No family hx of      Social History     Social History     Marital status: Single     Spouse name: N/A     Number of children: N/A     Years of education: N/A     Social History Main Topics     Smoking status: Never Smoker     Smokeless tobacco: Never Used     Alcohol use No     Drug use: No     Sexual activity: Not Asked     Other Topics Concern     None     Social History Narrative     There are no exam notes on file for this visit.  Chief Complaint   Patient presents with     Fall     Pt. fell on Saturday, pt. states that she fell down the last couple of stairs and landed wrong on her left foot, pt. was at [facility] and she states she broke the top of her foot, pt. states left foot still has of pain and swelling      Blood pressure 90/65, pulse 87, temperature 98.6  F (37  C), temperature source Oral, weight 225 lb 12.8 oz (102.4 kg), not currently breastfeeding.    S:  Patient fell on 9/9/17 and hurt left foot. Went to [place] ED and diagnosed with avulsion fracture of  Navicular of left foot. She was told to follow up with a Dr. Upon chart checkk, patient was advised to go to an orthopedics Dr. For follow up. Patient reports that she continues to have pain. Was given ibuprofen for pain because she decline narcotics but now requesting something stronger. Patient admits that she has been bearing weight on the foot. The swelling has worsened and the pain is more    O:  BP 90/65  Pulse 87  Temp 98.6  F (37  C) (Oral)  Wt 225 lb 12.8 oz (102.4 kg)  BMI 38.16 kg/m2  General: NAD, normal conversation  Heart: RRR. No murmurs, rubs clicks  Lungs: CTA, no wheeze  Extremities:  L bottom extremitiy is in bandage and splint is palpable. Patient is able to move toes and has sensation at tips of all toes. Cap  refill is < 2 dorsalis pedis is palpated. Trace edema is noted.     A/P:  1. Fracture, foot, left, closed, initial encounter  Fracture occurred 2 days ago and increasing pain. Neurologically intact. Pain is likely increasing as patient has been ambulating although she has been told to be non-weight bearing. Discussed extensively to not place weight on foot. Given percocet for pain and referral to see orthopedics this week. Advised to go to ED or call us if increased pain, numbness, tingling, increased swelling, or feels unwell.  - ORTHOPEDICS ADULT REFERRAL; Future  - oxyCODONE-acetaminophen (PERCOCET) 5-325 MG per tablet; Take 1 tablet by mouth every 6 hours as needed for moderate to severe pain maximum 3 tablet(s) per day  Dispense: 18 tablet; Refill: 0    Tanner Macario  Family Medicine Resident PGY3

## 2017-09-11 NOTE — MR AVS SNAPSHOT
After Visit Summary   9/11/2017    Rahel Schofield    MRN: [id number]           Patient Information     Date Of Birth          1950        Visit Information        Provider Department      9/11/2017 2:10 PM Tanner Macario DO [facility]        Today's Diagnoses     Fracture, foot, left, closed, initial encounter    -  1      Care Instructions    [facility]  [address] [place] [address]  [phone]  Date:  Time:    If you have any questions or need to reschedule please call the number listed above.  Any other concerns please call our referral coordinator at [phone]    Le  Care Coordinator     GAVE TO PATIENT           Follow-ups after your visit        Additional Services     ORTHOPEDICS ADULT REFERRAL       Patient to stop at the [facility] Desk    Reason for Referral: Patient fell on left foot. Went to [place] on 9/9/17 and diagnosed with avulsion fracture of navicular of left foot. Placed on splint and was suppose to follow with orthopedics this week  Referral Location: [facility]: [phone]     needed: Yes  Language: Montenegrin    May leave message on voicemail: No                  Future tests that were ordered for you today     Open Future Orders        Priority Expected Expires Ordered    ORTHOPEDICS ADULT REFERRAL Routine  1/11/2018 9/11/2017            Who to contact     Please call your clinic at [phone] to:    Ask questions about your health    Make or cancel appointments    Discuss your medicines    Learn about your test results    Speak to your doctor   If you have compliments or concerns about an experience at your clinic, or if you wish to file a complaint, please contact [facility] Patient Relations at [phone] or email us at [email or web address]         Additional Information About Your Visit        MyChart Information     [employer] is an electronic gateway that  provides easy, online access to your medical records. With [employer], you can request a clinic appointment, read your test results, renew a prescription or communicate with your care team.     To sign up for [employer] visit the website at [email or web address]   You will be asked to enter the access code listed below, as well as some personal information. Please follow the directions to create your username and password.     Your access code is: [email or web address]  Expires: 12/10/2017  2:55 PM     Your access code will  in 90 days. If you need help or a new code, please contact your [facility] or call [phone] for assistance.        Care EveryWhere ID     This is your Care EveryWhere ID. This could be used by other organizations to access your [place] medical records  [id number]        Your Vitals Were     Pulse Temperature BMI (Body Mass Index)             87 98.6  F (37  C) (Oral) 38.16 kg/m2          Blood Pressure from Last 3 Encounters:   17 90/65   17 105/74   17 95/67    Weight from Last 3 Encounters:   17 225 lb 12.8 oz (102.4 kg)   17 227 lb 12.8 oz (103.3 kg)   17 229 lb (103.9 kg)                 Today's Medication Changes          These changes are accurate as of: 17  2:55 PM.  If you have any questions, ask your nurse or doctor.               Start taking these medicines.        Dose/Directions    oxyCODONE-acetaminophen 5-325 MG per tablet   Commonly known as:  PERCOCET   Used for:  Fracture, foot, left, closed, initial encounter   Started by:  Tanner Macario,         Dose:  1 tablet   Take 1 tablet by mouth every 6 hours as needed for moderate to severe pain maximum 3 tablet(s) per day   Quantity:  18 tablet   Refills:  0            Where to get your medicines      Some of these will need a paper prescription and others can be bought over the counter.  Ask your nurse if you have questions.     Bring a paper prescription  for each of these medications     oxyCODONE-acetaminophen 5-325 MG per tablet                Primary Care Provider Office Phone # Fax #    Nabil Mesa [phone]       [address] [address]        Equal Access to Services     SHANEJUANI JASBIR : Hadii aad ku hadsuzetteo Soomaali, waaxda luqadaha, qaybta kaalmada adeegyada, waxay angelain haynahedn adeshasta pickering angelica harris. So [facility] [phone].    ATENCIÓN: Si habla español, tiene a rich disposición servicios gratuitos de asistencia lingüística. Llame al [phone].    We comply with applicable federal civil rights laws and [place] laws. We do not discriminate on the basis of race, color, national origin, age, disability sex, sexual orientation or gender identity.            Thank you!     Thank you for choosing [facility]  for your care. Our goal is always to provide you with excellent care. Hearing back from our patients is one way we can continue to improve our services. Please take a few minutes to complete the written survey that you may receive in the mail after your visit with us. Thank you!             Your Updated Medication List - Protect others around you: Learn how to safely use, store and throw away your medicines at www.disposemymeds.org.          This list is accurate as of: 9/11/17  2:55 PM.  Always use your most recent med list.                   Brand Name Dispense Instructions for use Diagnosis    * ACE KNEE BRACE LARGE/X-LARGE Misc     1 each    1 Units daily    Chronic pain of left knee       * CVS LUMBAR/BACK SUPPORT BRACE Misc     1 each    1 Units daily    Chronic left-sided low back pain with left-sided sciatica       * acetaminophen 325 MG tablet    TYLENOL     Take 650 mg by mouth        * acetaminophen 325 MG tablet    TYLENOL    100 tablet    Take 2 tablets (650 mg) by mouth every 6 hours as needed for pain    Plantar fasciitis of right foot       aspirin 81 MG EC tablet     90 tablet     Take 1 tablet (81 mg) by mouth daily    Chest pain, unspecified type       capsaicin 0.025 % Crea cream    ZOSTRIX    1 Tube    Apply to painful areas liberally.    Osteoarthritis of both shoulders, unspecified osteoarthritis type       guaiFENesin-dextromethorphan 100-10 MG/5ML syrup    ROBITUSSIN DM     Take 5 mLs by mouth        IBUPROFEN PO      Take 800 mg by mouth        naproxen 500 MG tablet    NAPROSYN    60 tablet    Take 1 tablet (500 mg) by mouth 2 times daily as needed for moderate pain    Plantar fasciitis of right foot       nitroGLYcerin 0.4 MG sublingual tablet    NITROSTAT    25 tablet    For chest pain place 1 tablet under the tongue every 5 minutes for 3 doses. If symptoms persist 5 minutes after 1st dose call 911.    Chest pain, unspecified type       order for DME     1 Units    Equipment being ordered: Heel cups    Pain of right heel       oxyCODONE-acetaminophen 5-325 MG per tablet    PERCOCET    18 tablet    Take 1 tablet by mouth every 6 hours as needed for moderate to severe pain maximum 3 tablet(s) per day    Fracture, foot, left, closed, initial encounter       ranitidine 300 MG tablet    ZANTAC    60 tablet    Take 1 tablet (300 mg) by mouth daily (with breakfast)    Nausea       sennosides 8.6 MG tablet    SENOKOT    120 tablet    Take 2 tablets by mouth 2 times daily    Constipation, unspecified constipation type       TRAMADOL HCL PO      Take 50 mg by mouth Take one tablet by mouth four times daily as needed for pain        * VITAMIN D (CHOLECALCIFEROL) PO      Take by mouth daily        * vitamin D3 2000 UNITS Caps      Take 2,000 Units by mouth        * vitamin D 2000 UNITS tablet     100 tablet    Take 2,000 Units by mouth daily    Vitamin D deficiency       * Notice:  This list has 7 medication(s) that are the same as other medications prescribed for you. Read the directions carefully, and ask your doctor or other care provider to review them with you.

## 2017-09-11 NOTE — PATIENT INSTRUCTIONS
[facility]  [address] [place] [address]  [phone]  Date: Thursday September 14 2017   Time: 1:30 PM Dr. Quevedo     If you have any questions or need to reschedule please call the number listed above.  Any other concerns please call our referral coordinator at [phone]    Le  Care Coordinator     GAVE TO PATIENT

## 2017-09-11 NOTE — PROGRESS NOTES
Preceptor attestation:  Patient seen and discussed with the resident. Assessment and plan reviewed with resident and agreed upon.  Supervising physician: Javi Bruner  [facility]

## 2021-06-03 ENCOUNTER — RECORDS - HEALTHEAST (OUTPATIENT)
Dept: ADMINISTRATIVE | Facility: CLINIC | Age: 71
End: 2021-06-03

## 2023-05-18 ENCOUNTER — TELEPHONE (OUTPATIENT)
Dept: INTERNAL MEDICINE | Facility: CLINIC | Age: 73
End: 2023-05-18

## 2023-05-18 NOTE — TELEPHONE ENCOUNTER
5/18 pt wants est care appt with Dr Sarabia, but only after 2PM, which needs approval. MRN: [id number] would also like to est care, on same day/together preferred.

## 2023-05-22 ENCOUNTER — TELEPHONE (OUTPATIENT)
Dept: FAMILY MEDICINE | Facility: CLINIC | Age: 73
End: 2023-05-22

## 2023-05-22 NOTE — TELEPHONE ENCOUNTER
Annual is non urgent. Next available for [facility] mid June. We are not able to accommodate. Reached out to patient who stated [facility] is not near her and she is looking for something closer. Let her know to check with [facility] regarding closer clinics.

## 2023-05-22 NOTE — TELEPHONE ENCOUNTER
5/22 lm through  service that Dr Sarabia approves an est care appt after 2pm and to call back to schedule. Second MRN: [id number] patient, said she already has an appt so doesn't need to make another one.

## 2023-05-22 NOTE — TELEPHONE ENCOUNTER
Reason for Call:  Appointment Request    Patient requesting this type of appt:  Preventive     Requested provider: Female provider    Reason patient unable to be scheduled: Not with their preferred provider    When does patient want to be seen/preferred time: 3-7 days    Comments: Patient would like to be seen by female provider at [facility] before next available appt date in [facility]    Okay to leave a detailed message?: Yes at Cell number on file:    Telephone Information:   Mobile [phone]       Call taken on 5/22/2023 at 3:32 PM by Jared Nettles